# Patient Record
(demographics unavailable — no encounter records)

---

## 2024-12-30 NOTE — HEALTH RISK ASSESSMENT
[Little interest or pleasure doing things] : 1) Little interest or pleasure doing things [Feeling down, depressed, or hopeless] : 2) Feeling down, depressed, or hopeless [0] : 2) Feeling down, depressed, or hopeless: Not at all (0) [PHQ-2 Negative - No further assessment needed] : PHQ-2 Negative - No further assessment needed [de-identified] : I spend 5 min performing a depression screening on this patient [[id number]] : 0 [Never] : Never

## 2024-12-30 NOTE — HISTORY OF PRESENT ILLNESS
[de-identified] : Ms. CANSECO is a32 year F with PMH of scoliosis who comes for annual physical. She is going to start studying soon and needs vaccinations and titers. No other complaints.

## 2024-12-30 NOTE — REVIEW OF SYSTEMS
LOV 11/1/19, due back 5/1/19 nothing scheduled     LR 10/18/19 30 and no refills [Negative] : Respiratory